# Patient Record
Sex: FEMALE | Race: WHITE | NOT HISPANIC OR LATINO
[De-identification: names, ages, dates, MRNs, and addresses within clinical notes are randomized per-mention and may not be internally consistent; named-entity substitution may affect disease eponyms.]

---

## 2017-04-13 PROBLEM — Z00.00 ENCOUNTER FOR PREVENTIVE HEALTH EXAMINATION: Status: ACTIVE | Noted: 2017-04-13

## 2017-08-24 ENCOUNTER — APPOINTMENT (OUTPATIENT)
Dept: DERMATOLOGY | Facility: CLINIC | Age: 77
End: 2017-08-24
Payer: MEDICARE

## 2017-08-24 PROCEDURE — 99213 OFFICE O/P EST LOW 20 MIN: CPT

## 2018-08-28 ENCOUNTER — APPOINTMENT (OUTPATIENT)
Dept: DERMATOLOGY | Facility: CLINIC | Age: 78
End: 2018-08-28
Payer: MEDICARE

## 2018-08-28 PROCEDURE — 99213 OFFICE O/P EST LOW 20 MIN: CPT

## 2019-05-22 ENCOUNTER — APPOINTMENT (OUTPATIENT)
Dept: PULMONOLOGY | Facility: CLINIC | Age: 79
End: 2019-05-22
Payer: MEDICARE

## 2019-05-22 VITALS
WEIGHT: 148 LBS | OXYGEN SATURATION: 96 % | HEART RATE: 92 BPM | DIASTOLIC BLOOD PRESSURE: 70 MMHG | HEIGHT: 64 IN | SYSTOLIC BLOOD PRESSURE: 140 MMHG | BODY MASS INDEX: 25.27 KG/M2

## 2019-05-22 DIAGNOSIS — Z80.0 FAMILY HISTORY OF MALIGNANT NEOPLASM OF DIGESTIVE ORGANS: ICD-10-CM

## 2019-05-22 DIAGNOSIS — R60.0 LOCALIZED EDEMA: ICD-10-CM

## 2019-05-22 DIAGNOSIS — Z87.09 PERSONAL HISTORY OF OTHER DISEASES OF THE RESPIRATORY SYSTEM: ICD-10-CM

## 2019-05-22 PROCEDURE — 85018 HEMOGLOBIN: CPT | Mod: QW

## 2019-05-22 PROCEDURE — 94729 DIFFUSING CAPACITY: CPT

## 2019-05-22 PROCEDURE — 94010 BREATHING CAPACITY TEST: CPT

## 2019-05-22 PROCEDURE — 99204 OFFICE O/P NEW MOD 45 MIN: CPT | Mod: 25

## 2019-05-22 PROCEDURE — 94727 GAS DIL/WSHOT DETER LNG VOL: CPT

## 2019-06-04 ENCOUNTER — FORM ENCOUNTER (OUTPATIENT)
Age: 79
End: 2019-06-04

## 2019-06-05 ENCOUNTER — OUTPATIENT (OUTPATIENT)
Dept: OUTPATIENT SERVICES | Facility: HOSPITAL | Age: 79
LOS: 1 days | End: 2019-06-05
Payer: MEDICARE

## 2019-06-05 ENCOUNTER — APPOINTMENT (OUTPATIENT)
Dept: CT IMAGING | Facility: CLINIC | Age: 79
End: 2019-06-05
Payer: MEDICARE

## 2019-06-05 DIAGNOSIS — J84.9 INTERSTITIAL PULMONARY DISEASE, UNSPECIFIED: ICD-10-CM

## 2019-06-05 PROCEDURE — 71250 CT THORAX DX C-: CPT

## 2019-06-05 PROCEDURE — 71250 CT THORAX DX C-: CPT | Mod: 26

## 2019-06-14 ENCOUNTER — RESULT REVIEW (OUTPATIENT)
Age: 79
End: 2019-06-14

## 2019-08-26 ENCOUNTER — APPOINTMENT (OUTPATIENT)
Dept: PULMONOLOGY | Facility: CLINIC | Age: 79
End: 2019-08-26
Payer: MEDICARE

## 2019-08-26 VITALS
BODY MASS INDEX: 25.02 KG/M2 | HEART RATE: 82 BPM | SYSTOLIC BLOOD PRESSURE: 126 MMHG | HEIGHT: 63.25 IN | DIASTOLIC BLOOD PRESSURE: 82 MMHG | WEIGHT: 143 LBS | OXYGEN SATURATION: 96 %

## 2019-08-26 DIAGNOSIS — R93.89 ABNORMAL FINDINGS ON DIAGNOSTIC IMAGING OF OTHER SPECIFIED BODY STRUCTURES: ICD-10-CM

## 2019-08-26 PROCEDURE — 94729 DIFFUSING CAPACITY: CPT

## 2019-08-26 PROCEDURE — 94727 GAS DIL/WSHOT DETER LNG VOL: CPT

## 2019-08-26 PROCEDURE — 85018 HEMOGLOBIN: CPT | Mod: QW

## 2019-08-26 PROCEDURE — 94010 BREATHING CAPACITY TEST: CPT

## 2019-08-26 PROCEDURE — 99214 OFFICE O/P EST MOD 30 MIN: CPT | Mod: 25

## 2019-08-26 RX ORDER — CALCIUM CITRATE/VITAMIN D3 315MG-6.25
TABLET ORAL
Refills: 0 | Status: ACTIVE | COMMUNITY

## 2019-08-26 RX ORDER — PREDNISONE 10 MG/1
10 TABLET ORAL
Qty: 21 | Refills: 0 | Status: DISCONTINUED | COMMUNITY
Start: 2019-05-22 | End: 2019-08-26

## 2019-08-26 RX ORDER — BENZONATATE 100 MG/1
100 CAPSULE ORAL 3 TIMES DAILY
Qty: 30 | Refills: 0 | Status: DISCONTINUED | COMMUNITY
Start: 2019-05-22 | End: 2019-08-26

## 2019-08-26 RX ORDER — HYDROCHLOROTHIAZIDE 12.5 MG/1
12.5 TABLET ORAL
Refills: 0 | Status: ACTIVE | COMMUNITY

## 2019-09-10 ENCOUNTER — FORM ENCOUNTER (OUTPATIENT)
Age: 79
End: 2019-09-10

## 2019-09-11 ENCOUNTER — APPOINTMENT (OUTPATIENT)
Dept: CT IMAGING | Facility: CLINIC | Age: 79
End: 2019-09-11
Payer: MEDICARE

## 2019-09-11 ENCOUNTER — OUTPATIENT (OUTPATIENT)
Dept: OUTPATIENT SERVICES | Facility: HOSPITAL | Age: 79
LOS: 1 days | End: 2019-09-11
Payer: MEDICARE

## 2019-09-11 DIAGNOSIS — Z00.00 ENCOUNTER FOR GENERAL ADULT MEDICAL EXAMINATION WITHOUT ABNORMAL FINDINGS: ICD-10-CM

## 2019-09-11 DIAGNOSIS — J84.9 INTERSTITIAL PULMONARY DISEASE, UNSPECIFIED: ICD-10-CM

## 2019-09-11 PROCEDURE — 71250 CT THORAX DX C-: CPT | Mod: 26

## 2019-09-11 PROCEDURE — 71250 CT THORAX DX C-: CPT

## 2019-09-17 ENCOUNTER — TRANSCRIPTION ENCOUNTER (OUTPATIENT)
Age: 79
End: 2019-09-17

## 2019-10-16 ENCOUNTER — LABORATORY RESULT (OUTPATIENT)
Age: 79
End: 2019-10-16

## 2019-10-16 ENCOUNTER — APPOINTMENT (OUTPATIENT)
Dept: RHEUMATOLOGY | Facility: CLINIC | Age: 79
End: 2019-10-16
Payer: MEDICARE

## 2019-10-16 VITALS
OXYGEN SATURATION: 97 % | RESPIRATION RATE: 17 BRPM | TEMPERATURE: 98.8 F | HEIGHT: 66 IN | DIASTOLIC BLOOD PRESSURE: 72 MMHG | BODY MASS INDEX: 23.63 KG/M2 | SYSTOLIC BLOOD PRESSURE: 103 MMHG | WEIGHT: 147 LBS | HEART RATE: 82 BPM

## 2019-10-16 PROCEDURE — 99204 OFFICE O/P NEW MOD 45 MIN: CPT | Mod: 25

## 2019-10-16 PROCEDURE — 36415 COLL VENOUS BLD VENIPUNCTURE: CPT

## 2019-10-23 LAB
25(OH)D3 SERPL-MCNC: 47.8 NG/ML
ALBUMIN MFR SERPL ELPH: 57.1 %
ALBUMIN SERPL ELPH-MCNC: 4.5 G/DL
ALBUMIN SERPL-MCNC: 4 G/DL
ALBUMIN/GLOB SERPL: 1.3 RATIO
ALBUPE: 18.7 %
ALDOLASE SERPL-CCNC: 2.7 U/L
ALP BLD-CCNC: 79 U/L
ALPHA1 GLOB MFR SERPL ELPH: 4 %
ALPHA1 GLOB SERPL ELPH-MCNC: 0.3 G/DL
ALPHA1UPE: 32.4 %
ALPHA2 GLOB MFR SERPL ELPH: 10.6 %
ALPHA2 GLOB SERPL ELPH-MCNC: 0.7 G/DL
ALPHA2UPE: 20.9 %
ALT SERPL-CCNC: 11 U/L
ANA SER IF-ACNC: NEGATIVE
ANION GAP SERPL CALC-SCNC: 13 MMOL/L
AST SERPL-CCNC: 21 U/L
B-GLOBULIN MFR SERPL ELPH: 12.7 %
B-GLOBULIN SERPL ELPH-MCNC: 0.9 G/DL
BASOPHILS # BLD AUTO: 0.06 K/UL
BASOPHILS NFR BLD AUTO: 0.7 %
BETAUPE: 16.3 %
BILIRUB SERPL-MCNC: 0.8 MG/DL
BUN SERPL-MCNC: 13 MG/DL
C3 SERPL-MCNC: 116 MG/DL
C4 SERPL-MCNC: 15 MG/DL
CALCIUM SERPL-MCNC: 10.2 MG/DL
CCP AB SER IA-ACNC: <8 UNITS
CENTROMERE IGG SER-ACNC: <0.2 CD:130001892
CHLORIDE SERPL-SCNC: 101 MMOL/L
CK SERPL-CCNC: 57 U/L
CO2 SERPL-SCNC: 27 MMOL/L
CREAT 24H UR-MCNC: NORMAL G/24 H
CREAT SERPL-MCNC: 0.68 MG/DL
CREATININE UR (MAYO): 55 MG/DL
CRP SERPL-MCNC: 0.33 MG/DL
DSDNA AB SER-ACNC: <12 IU/ML
ENA JO1 AB SER IA-ACNC: <0.2 AL
ENA RNP AB SER IA-ACNC: <0.2 AL
ENA SCL70 IGG SER IA-ACNC: 0.7 AL
ENA SM AB SER IA-ACNC: <0.2 AL
ENA SS-A AB SER IA-ACNC: <0.2 AL
ENA SS-B AB SER IA-ACNC: <0.2 AL
EOSINOPHIL # BLD AUTO: 0.14 K/UL
EOSINOPHIL NFR BLD AUTO: 1.6 %
ERYTHROCYTE [SEDIMENTATION RATE] IN BLOOD BY WESTERGREN METHOD: 35 MM/HR
GAMMA GLOB FLD ELPH-MCNC: 1.1 G/DL
GAMMA GLOB MFR SERPL ELPH: 15.6 %
GAMMAUPE: 11.7 %
GLUCOSE SERPL-MCNC: 110 MG/DL
HAV IGM SER QL: NONREACTIVE
HBV CORE IGG+IGM SER QL: NONREACTIVE
HBV CORE IGM SER QL: NONREACTIVE
HBV E AB SER QL: NEGATIVE
HBV E AG SER QL: NEGATIVE
HBV SURFACE AB SER QL: NONREACTIVE
HBV SURFACE AG SER QL: NONREACTIVE
HCT VFR BLD CALC: 47.4 %
HCV AB SER QL: NONREACTIVE
HCV S/CO RATIO: 0.17 S/CO
HGB BLD-MCNC: 15.3 G/DL
IGA 24H UR QL IFE: NORMAL
IMM GRANULOCYTES NFR BLD AUTO: 0.6 %
INTERPRETATION SERPL IEP-IMP: NORMAL
KAPPA LC 24H UR QL: NORMAL
LYMPHOCYTES # BLD AUTO: 2.55 K/UL
LYMPHOCYTES NFR BLD AUTO: 29.5 %
M PROTEIN SPEC IFE-MCNC: NORMAL
M TB IFN-G BLD-IMP: NEGATIVE
MAN DIFF?: NORMAL
MCHC RBC-ENTMCNC: 28.5 PG
MCHC RBC-ENTMCNC: 32.3 GM/DL
MCV RBC AUTO: 88.4 FL
MONOCYTES # BLD AUTO: 0.86 K/UL
MONOCYTES NFR BLD AUTO: 9.9 %
MPO AB + PR3 PNL SER: NORMAL
NEUTROPHILS # BLD AUTO: 4.99 K/UL
NEUTROPHILS NFR BLD AUTO: 57.7 %
PLATELET # BLD AUTO: 409 K/UL
POTASSIUM SERPL-SCNC: 3.8 MMOL/L
PROT PATTERN 24H UR ELPH-IMP: NORMAL
PROT SERPL-MCNC: 7 G/DL
PROT UR-MCNC: 6 MG/DL
PROT UR-MCNC: 6 MG/DL
QUANTIFERON TB PLUS MITOGEN MINUS NIL: 2.37 IU/ML
QUANTIFERON TB PLUS NIL: 0.01 IU/ML
QUANTIFERON TB PLUS TB1 MINUS NIL: 0.01 IU/ML
QUANTIFERON TB PLUS TB2 MINUS NIL: 0.07 IU/ML
RBC # BLD: 5.36 M/UL
RBC # FLD: 13.3 %
RF+CCP IGG SER-IMP: NEGATIVE
RHEUMATOID FACT SER QL: <10 IU/ML
SODIUM SERPL-SCNC: 141 MMOL/L
SPECIMEN VOL 24H UR: NORMAL ML
TSH SERPL-ACNC: 4.24 UIU/ML
WBC # FLD AUTO: 8.65 K/UL

## 2019-11-04 ENCOUNTER — APPOINTMENT (OUTPATIENT)
Dept: RHEUMATOLOGY | Facility: CLINIC | Age: 79
End: 2019-11-04
Payer: MEDICARE

## 2019-11-04 ENCOUNTER — APPOINTMENT (OUTPATIENT)
Dept: RHEUMATOLOGY | Facility: CLINIC | Age: 79
End: 2019-11-04

## 2019-11-04 VITALS
SYSTOLIC BLOOD PRESSURE: 128 MMHG | BODY MASS INDEX: 24.69 KG/M2 | RESPIRATION RATE: 17 BRPM | TEMPERATURE: 98.9 F | DIASTOLIC BLOOD PRESSURE: 68 MMHG | WEIGHT: 150 LBS | HEART RATE: 107 BPM | OXYGEN SATURATION: 98 % | HEIGHT: 65.5 IN

## 2019-11-04 LAB
EJ AB SER QL: NEGATIVE
ENA JO1 AB SER IA-ACNC: <20 UNITS
ENA PM/SCL AB SER-ACNC: <20 UNITS
ENA SM+RNP AB SER IA-ACNC: <20 UNITS
ENA SS-A IGG SER QL: <20 UNITS
FIBRILLARIN AB SER QL: NEGATIVE
KU AB SER QL: NEGATIVE
MDA-5 (P140)(CADM-140): <20 UNITS
MI2 AB SER QL: NEGATIVE
NXP-2 (P140): <20 UNITS
OJ AB SER QL: NEGATIVE
PL12 AB SER QL: NEGATIVE
PL7 AB SER QL: NEGATIVE
SRP AB SERPL QL: NEGATIVE
TIF GAMMA (P155/140): <20 UNITS
U2 SNRNP AB SER QL: NEGATIVE

## 2019-11-04 PROCEDURE — 99213 OFFICE O/P EST LOW 20 MIN: CPT

## 2019-11-04 NOTE — PHYSICAL EXAM
[General Appearance - Alert] : alert [General Appearance - In No Acute Distress] : in no acute distress [Sclera] : the sclera and conjunctiva were normal [PERRL With Normal Accommodation] : pupils were equal in size, round, and reactive to light [Outer Ear] : the ears and nose were normal in appearance [Oropharynx] : the oropharynx was normal [Neck Appearance] : the appearance of the neck was normal [Respiration, Rhythm And Depth] : normal respiratory rhythm and effort [Exaggerated Use Of Accessory Muscles For Inspiration] : no accessory muscle use [Heart Rate And Rhythm] : heart rate was normal and rhythm regular [Heart Sounds] : normal S1 and S2 [Heart Sounds Gallop] : no gallops [Murmurs] : no murmurs [Heart Sounds Pericardial Friction Rub] : no pericardial rub [Bowel Sounds] : normal bowel sounds [Abdomen Soft] : soft [Abdomen Tenderness] : non-tender [No CVA Tenderness] : no ~M costovertebral angle tenderness [No Spinal Tenderness] : no spinal tenderness [Abnormal Walk] : normal gait [Nail Clubbing] : no clubbing  or cyanosis of the fingernails [Involuntary Movements] : no involuntary movements were seen [Musculoskeletal - Swelling] : no joint swelling seen [Motor Tone] : muscle strength and tone were normal [] : no rash [Skin Lesions] : no skin lesions [No Focal Deficits] : no focal deficits [Oriented To Time, Place, And Person] : oriented to person, place, and time [FreeTextEntry1] : +mild crackles at bases

## 2019-11-04 NOTE — ASSESSMENT
[FreeTextEntry1] : 79 year old female with presents today for an initial evaluation. Found to have ILD- Nonspecific pattern with PFT showing mild restrictive changes in May 2019. Now s/p repeat CT chest and PFTs 09/2019 w/o significant changes.  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  Currently asymptomatic. ROS and PE otherwise unremarkable for underlying classic CTD/ systemic autoimmune disease. Will continue to monitor. \par \par Discussed treatment plan with the patient and her daughter. The patient was given the opportunity to ask questions and all questions were answered to their satisfaction.\par

## 2019-11-04 NOTE — HISTORY OF PRESENT ILLNESS
[FreeTextEntry1] : Pt presenting today for a f.u visit. \par Denies fever, chills, cough, sob. \par ROS unchanged from prior\par

## 2020-08-03 ENCOUNTER — APPOINTMENT (OUTPATIENT)
Dept: DERMATOLOGY | Facility: CLINIC | Age: 80
End: 2020-08-03
Payer: MEDICARE

## 2020-08-03 PROCEDURE — 99214 OFFICE O/P EST MOD 30 MIN: CPT | Mod: 25

## 2020-08-03 PROCEDURE — 17000 DESTRUCT PREMALG LESION: CPT

## 2020-08-03 PROCEDURE — 17003 DESTRUCT PREMALG LES 2-14: CPT

## 2020-09-05 ENCOUNTER — APPOINTMENT (OUTPATIENT)
Dept: CT IMAGING | Facility: CLINIC | Age: 80
End: 2020-09-05
Payer: MEDICARE

## 2020-09-05 ENCOUNTER — OUTPATIENT (OUTPATIENT)
Dept: OUTPATIENT SERVICES | Facility: HOSPITAL | Age: 80
LOS: 1 days | End: 2020-09-05
Payer: MEDICARE

## 2020-09-05 DIAGNOSIS — J84.9 INTERSTITIAL PULMONARY DISEASE, UNSPECIFIED: ICD-10-CM

## 2020-09-05 DIAGNOSIS — Z00.00 ENCOUNTER FOR GENERAL ADULT MEDICAL EXAMINATION WITHOUT ABNORMAL FINDINGS: ICD-10-CM

## 2020-09-05 PROCEDURE — 71250 CT THORAX DX C-: CPT

## 2020-09-05 PROCEDURE — 71250 CT THORAX DX C-: CPT | Mod: 26

## 2020-10-16 ENCOUNTER — APPOINTMENT (OUTPATIENT)
Dept: DISASTER EMERGENCY | Facility: CLINIC | Age: 80
End: 2020-10-16

## 2020-10-30 ENCOUNTER — APPOINTMENT (OUTPATIENT)
Dept: PULMONOLOGY | Facility: CLINIC | Age: 80
End: 2020-10-30
Payer: MEDICARE

## 2020-10-30 PROCEDURE — ZZZZZ: CPT

## 2020-11-02 ENCOUNTER — APPOINTMENT (OUTPATIENT)
Dept: PULMONOLOGY | Facility: CLINIC | Age: 80
End: 2020-11-02
Payer: MEDICARE

## 2020-11-02 VITALS
SYSTOLIC BLOOD PRESSURE: 132 MMHG | HEART RATE: 82 BPM | DIASTOLIC BLOOD PRESSURE: 68 MMHG | RESPIRATION RATE: 14 BRPM | OXYGEN SATURATION: 96 %

## 2020-11-02 VITALS — BODY MASS INDEX: 24.24 KG/M2 | HEIGHT: 64 IN | WEIGHT: 142 LBS

## 2020-11-02 DIAGNOSIS — J84.9 INTERSTITIAL PULMONARY DISEASE, UNSPECIFIED: ICD-10-CM

## 2020-11-02 DIAGNOSIS — R05 COUGH: ICD-10-CM

## 2020-11-02 LAB — SARS-COV-2 N GENE NPH QL NAA+PROBE: NOT DETECTED

## 2020-11-02 PROCEDURE — 94729 DIFFUSING CAPACITY: CPT

## 2020-11-02 PROCEDURE — 99214 OFFICE O/P EST MOD 30 MIN: CPT | Mod: 25

## 2020-11-02 PROCEDURE — 85018 HEMOGLOBIN: CPT | Mod: QW

## 2020-11-02 PROCEDURE — 94010 BREATHING CAPACITY TEST: CPT

## 2020-11-02 PROCEDURE — 94727 GAS DIL/WSHOT DETER LNG VOL: CPT

## 2020-11-02 RX ORDER — TROPICAMIDE 10 MG/ML
1 SOLUTION/ DROPS OPHTHALMIC
Qty: 15 | Refills: 0 | Status: ACTIVE | COMMUNITY
Start: 2020-08-27

## 2020-11-02 RX ORDER — LOTEPREDNOL ETABONATE 10 MG/ML
1 SUSPENSION TOPICAL
Qty: 3 | Refills: 0 | Status: ACTIVE | COMMUNITY
Start: 2020-08-27

## 2020-11-02 RX ORDER — BROMFENAC 0.76 MG/ML
0.07 SOLUTION/ DROPS OPHTHALMIC
Qty: 5 | Refills: 0 | Status: ACTIVE | COMMUNITY
Start: 2020-08-27

## 2020-11-02 RX ORDER — CHLORHEXIDINE GLUCONATE, 0.12% ORAL RINSE 1.2 MG/ML
0.12 SOLUTION DENTAL
Qty: 473 | Refills: 0 | Status: ACTIVE | COMMUNITY
Start: 2020-09-04

## 2020-11-02 RX ORDER — FLUOROURACIL 50 MG/G
5 CREAM TOPICAL
Qty: 40 | Refills: 0 | Status: ACTIVE | COMMUNITY
Start: 2020-08-03

## 2020-11-02 RX ORDER — BESIFLOXACIN 6 MG/ML
0.6 SUSPENSION OPHTHALMIC
Qty: 5 | Refills: 0 | Status: ACTIVE | COMMUNITY
Start: 2020-08-27

## 2020-11-02 RX ORDER — HYDROCHLOROTHIAZIDE 12.5 MG/1
12.5 CAPSULE ORAL
Qty: 90 | Refills: 0 | Status: ACTIVE | COMMUNITY
Start: 2020-09-03

## 2020-12-21 PROBLEM — Z87.09 HISTORY OF ACUTE BRONCHITIS: Status: RESOLVED | Noted: 2019-05-22 | Resolved: 2020-12-21

## 2021-06-17 ENCOUNTER — APPOINTMENT (OUTPATIENT)
Dept: DERMATOLOGY | Facility: CLINIC | Age: 81
End: 2021-06-17
Payer: MEDICARE

## 2021-06-17 PROCEDURE — 99214 OFFICE O/P EST MOD 30 MIN: CPT | Mod: 25

## 2021-06-17 PROCEDURE — 17000 DESTRUCT PREMALG LESION: CPT

## 2022-07-06 ENCOUNTER — APPOINTMENT (OUTPATIENT)
Dept: DERMATOLOGY | Facility: CLINIC | Age: 82
End: 2022-07-06

## 2022-07-06 DIAGNOSIS — C44.41 BASAL CELL CARCINOMA OF SKIN OF SCALP AND NECK: ICD-10-CM

## 2022-07-06 PROCEDURE — 17311 MOHS 1 STAGE H/N/HF/G: CPT

## 2022-07-06 PROCEDURE — 12042 INTMD RPR N-HF/GENIT2.6-7.5: CPT

## 2022-07-06 RX ORDER — MUPIROCIN 20 MG/G
2 OINTMENT TOPICAL TWICE DAILY
Qty: 1 | Refills: 2 | Status: ACTIVE | COMMUNITY
Start: 2022-07-06 | End: 1900-01-01

## 2022-07-20 ENCOUNTER — OUTPATIENT (OUTPATIENT)
Dept: OUTPATIENT SERVICES | Facility: HOSPITAL | Age: 82
LOS: 1 days | End: 2022-07-20
Payer: MEDICARE

## 2022-07-20 ENCOUNTER — APPOINTMENT (OUTPATIENT)
Dept: CT IMAGING | Facility: CLINIC | Age: 82
End: 2022-07-20

## 2022-07-20 DIAGNOSIS — J84.9 INTERSTITIAL PULMONARY DISEASE, UNSPECIFIED: ICD-10-CM

## 2022-07-20 PROCEDURE — 71250 CT THORAX DX C-: CPT | Mod: 26,MH

## 2022-07-20 PROCEDURE — 71250 CT THORAX DX C-: CPT

## 2022-07-21 ENCOUNTER — APPOINTMENT (OUTPATIENT)
Dept: DERMATOLOGY | Facility: CLINIC | Age: 82
End: 2022-07-21

## 2022-07-21 PROCEDURE — 17260 DSTRJ MAL LES T/A/L 0.5 CM/<: CPT

## 2022-07-21 PROCEDURE — 99212 OFFICE O/P EST SF 10 MIN: CPT | Mod: 25

## 2022-07-26 ENCOUNTER — NON-APPOINTMENT (OUTPATIENT)
Age: 82
End: 2022-07-26

## 2022-07-28 ENCOUNTER — APPOINTMENT (OUTPATIENT)
Dept: PULMONOLOGY | Facility: CLINIC | Age: 82
End: 2022-07-28

## 2022-08-30 ENCOUNTER — APPOINTMENT (OUTPATIENT)
Dept: PULMONOLOGY | Facility: CLINIC | Age: 82
End: 2022-08-30